# Patient Record
Sex: FEMALE | Race: BLACK OR AFRICAN AMERICAN | Employment: FULL TIME | URBAN - METROPOLITAN AREA
[De-identification: names, ages, dates, MRNs, and addresses within clinical notes are randomized per-mention and may not be internally consistent; named-entity substitution may affect disease eponyms.]

---

## 2023-03-17 ENCOUNTER — OFFICE VISIT (OUTPATIENT)
Dept: URGENT CARE | Age: 38
End: 2023-03-17

## 2023-03-17 VITALS
SYSTOLIC BLOOD PRESSURE: 131 MMHG | RESPIRATION RATE: 16 BRPM | HEART RATE: 69 BPM | OXYGEN SATURATION: 98 % | DIASTOLIC BLOOD PRESSURE: 66 MMHG | HEIGHT: 69 IN | BODY MASS INDEX: 25.18 KG/M2 | TEMPERATURE: 98.3 F | WEIGHT: 170 LBS

## 2023-03-17 DIAGNOSIS — S05.01XA ABRASION OF RIGHT CORNEA, INITIAL ENCOUNTER: Primary | ICD-10-CM

## 2023-03-17 RX ORDER — LANOLIN ALCOHOL/MO/W.PET/CERES
CREAM (GRAM) TOPICAL
COMMUNITY

## 2023-03-17 RX ORDER — LEVONORGESTREL / ETHINYL ESTRADIOL AND ETHINYL ESTRADIOL 150-30(84)
KIT ORAL
COMMUNITY

## 2023-03-17 RX ORDER — KETOROLAC TROMETHAMINE 5 MG/ML
1 SOLUTION OPHTHALMIC
Qty: 1 EACH | Refills: 0 | Status: SHIPPED | OUTPATIENT
Start: 2023-03-17 | End: 2023-03-21

## 2023-03-17 NOTE — PROGRESS NOTES
Pt here today with complaint of right eye pain for about 30 minutes. States that she was driving with the window open, and suddenly developed a sensation of something in her eye. She reports redness, pain, tearing, foreign body sensation. No contact use. History reviewed. No pertinent past medical history. History reviewed. No pertinent surgical history. History reviewed. No pertinent family history. Social History     Socioeconomic History    Marital status: SINGLE     Spouse name: Not on file    Number of children: Not on file    Years of education: Not on file    Highest education level: Not on file   Occupational History    Not on file   Tobacco Use    Smoking status: Not on file    Smokeless tobacco: Not on file   Substance and Sexual Activity    Alcohol use: Not on file    Drug use: Not on file    Sexual activity: Not on file   Other Topics Concern    Not on file   Social History Narrative    Not on file     Social Determinants of Health     Financial Resource Strain: Not on file   Food Insecurity: Not on file   Transportation Needs: Not on file   Physical Activity: Not on file   Stress: Not on file   Social Connections: Not on file   Intimate Partner Violence: Not on file   Housing Stability: Not on file                ALLERGIES: Patient has no known allergies. Review of Systems   All other systems reviewed and are negative. Vitals:    03/17/23 1757   BP: 131/66   Pulse: 69   Resp: 16   Temp: 98.3 °F (36.8 °C)   SpO2: 98%   Weight: 170 lb (77.1 kg)   Height: 5' 9\" (1.753 m)       Physical Exam  Vitals reviewed. Constitutional:       General: She is not in acute distress. Appearance: Normal appearance. She is not ill-appearing or diaphoretic. Eyes:      General: Lids are normal. Lids are everted, no foreign bodies appreciated. Vision grossly intact. Gaze aligned appropriately. No visual field deficit. Right eye: No foreign body.       Extraocular Movements: Extraocular movements intact. Right eye: Normal extraocular motion and no nystagmus. Conjunctiva/sclera:      Right eye: No hemorrhage. Pupils: Pupils are equal, round, and reactive to light. Comments: No opacities no infiltrate, no ulcerations. Cardiovascular:      Rate and Rhythm: Normal rate and regular rhythm. Pulses: Normal pulses. Heart sounds: Normal heart sounds. Neurological:      Mental Status: She is alert. MDM     Differential Diagnosis; Clinical Impression; Plan:       CLINICAL IMPRESSION:  (S05.01XA) Abrasion of right cornea, initial encounter  (primary encounter diagnosis)    Orders Placed This Encounter      ketorolac (ACULAR) 0.5 % ophthalmic solution          Sig: Administer 1 Drop to right eye four (4) times daily as needed for Pain for up to 4 days. Dispense:  1 Each          Refill:  0          Order Comments: Dispense 5mL bottle. Plan:  Exam findings consistent with corneal abrasion  No foreign body but suspect this may have been initial cause then rubbing eye may have caused abrasion. Start topical ketorolac  Eye rest  See eye specialist if not resolved within 48 hours  Immediate re eval for worsening or changes       We have reviewed concerning signs/symptoms, normal vs abnormal progression of medical condition and when to seek immediate medical attention.               Procedures